# Patient Record
(demographics unavailable — no encounter records)

---

## 2024-10-14 NOTE — HISTORY OF PRESENT ILLNESS
[HPV test offered] : HPV test offered [N] : Patient does not use contraception [Y] : Positive pregnancy history [Currently Active] : currently active [Men] : men

## 2024-10-14 NOTE — PHYSICAL EXAM
[Chaperone Present] : A chaperone was present in the examining room during all aspects of the physical examination [Appropriately responsive] : appropriately responsive [Alert] : alert [No Acute Distress] : no acute distress [Oriented x3] : oriented x3 [Examination Of The Breasts] : a normal appearance [Normal] : normal [No Discharge] : no discharge [No Masses] : no breast masses were palpable

## 2025-06-27 NOTE — PHYSICAL EXAM
[Appropriately responsive] : appropriately responsive [Alert] : alert [No Acute Distress] : no acute distress [Oriented x3] : oriented x3 [Examination Of The Breasts] : a normal appearance [No Masses] : no breast masses were palpable [Labia Majora] : normal [Labia Minora] : normal [Normal] : normal [Uterine Adnexae] : non-palpable [MA] : MA [Soft] : soft [Non-tender] : non-tender [Non-distended] : non-distended

## 2025-06-27 NOTE — END OF VISIT
[FreeTextEntry3] :  I, Izabel Priceghar solely acted as scribe for Dr. Myrtle Cornejo on 06/27/2025 All medical entries made by the Scribe were at my, Dr. Cornejo, direction and personally dictated by me on 06/27/2025 . I have reviewed the chart and agree that the record accurately reflects my personal performance of the history, physical exam, assessment and plan. I have also personally directed, reviewed, and agreed with the chart.

## 2025-06-27 NOTE — HISTORY OF PRESENT ILLNESS
[N] : Patient does not use contraception [Y] : Positive pregnancy history [No] : Patient does not have concerns regarding sex [Currently Active] : currently active [Men] : men [Mammogramdate] : 06/25/2024 [TextBox_19] : BR2 [BreastSonogramDate] : 10/25/2024 [TextBox_25] : BR2 [PapSmeardate] : 06/25/2024 [TextBox_31] : NEGATIVE [HPVDate] : 06/25/2024 [TextBox_78] : NEGATIVE [LMPDate] : 06/18/2025 [PGHxTotal] : 4 [[facility]xF[facility]] : 1 [[facility] Hospital[facility]] : 1 [PGHxABInduced] : 3 [FreeTextEntry1] : 06/18/2025

## 2025-06-27 NOTE — DISCUSSION/SUMMARY
[FreeTextEntry1] : Pap deferred until 2027 per [facility] guidelines.  Prescription for mammogram screening and breast US given. Self-breast exam reviewed.  For  sxs, Rx renewal for Estradiol 0.05 MG & Oral Progesterone 100 MG given.   She will follow up annually and as needed.